# Patient Record
Sex: MALE | Race: WHITE
[De-identification: names, ages, dates, MRNs, and addresses within clinical notes are randomized per-mention and may not be internally consistent; named-entity substitution may affect disease eponyms.]

---

## 2017-03-21 ENCOUNTER — HOSPITAL ENCOUNTER (OUTPATIENT)
Dept: HOSPITAL 68 - STS | Age: 52
End: 2017-03-21
Attending: SURGERY
Payer: COMMERCIAL

## 2017-03-21 VITALS — HEIGHT: 67 IN | BODY MASS INDEX: 30.92 KG/M2 | WEIGHT: 197 LBS

## 2017-03-21 DIAGNOSIS — K40.90: Primary | ICD-10-CM

## 2017-03-21 DIAGNOSIS — I10: ICD-10-CM

## 2017-03-21 PROCEDURE — C9399 UNCLASSIFIED DRUGS OR BIOLOG: HCPCS

## 2017-03-21 PROCEDURE — C1781 MESH (IMPLANTABLE): HCPCS

## 2017-03-23 NOTE — OPERATIVE REPORT
Operative/Inv Procedure Report
Surgery Date: 03/21/17
Name of Procedure:
Laparoscopic preperitoneal mesh repair of left inguino-scrotal hernia
Pre-Operative Diagnosis:
Left inguinal scrotal hernia
Post-Operative Diagnosis:
Same, pantaloon
Estimated Blood Loss: fanta
Surgeon/Assistant:
MELISSA MESSER,GARY ANDINO
Anesthesia: general endotracheal tube
 
Operative/Procedure Note
Note:
Patient was positioned supine on the table.  After successful induction of 
general anesthesia the inguinal and surrounding areas were clipped prepped and 
draped in the usual sterile fashion.  After injection of local anesthetic at the
bottom of the umbilicus off the midline, to the left, a 1 1/2 cm curved incision
was made with a 15 blade, then deepened through Teagan's fascia, sweeping off 
the rectus sheath.  A horizontal 1-1/2 cm incision was made between the fibers, 
elevating these edges with 0 Vicryl stay sutures. Then retracting the muscle 
laterally, clearing off the posterior rectus sheath, this space is developed 
down the midline to the pubis sequentially, with an S retractor, a peanut 
dissector, then the balloon-camera device, inflating it 30-40 pumps while 
watching how it opens up the space, keeping the epigastrics up.  The balloon is 
then replaced with a 10 mm Munoz trocar, inserted, shortened, and secured with 
the stay sutures, gas turned on to 12 not 15 mm. Then two 5 mm trochars are 
inserted in the midline just below the camera, spaced by approximately 3 cm. 
Using mostly blunt dissection with peanuts to define the anatomy, first Ronny's
ligament is swept off medially, checking the medial spaces, direct and femoral. 
There was an obvious direct defect with contents still in it. Then briefly 
skipping over the area of the iliac fat pad, we developed the iliopubic tract 
out laterally to the iliac crest.  Then we returned to the area of the fat pad 
where the hernia sac and the cord structures are adherent, coursing up into an 
attenuated deep ring.  The cord structures form a triangle with the vas 
approaching medially and the main vessels approaching laterally, with the apex 
at the deep ring.  There was some preperitoneal fat up inside in front that was 
dragged down and out, helping identify the distal lip of the hernia sac, which 
is then carefully peeled off the cord structures, especially the vas.  The cord 
is also  from the underlying iliac fat.  Once the hernia sac is 
 from the cord structures down to the base of this "triangle," and 
after reducing the direct hernia, a Parietex sided mesh with the suture, is 
marked and stuffed down the camera trocar, then unfurled in a systematic fashion
, first with the smaller leaflet passing behind the cord structures, until the 
flap covers the epigastrics, covering the deep ring, then the larger leaflet is 
released from the suture, double-covering the smaller one, but also extends 
laterally out to the iliac crest, medially over Ronny's ligament, and 
superiorly towards the camera. There is a third part of the mesh, that covers 
the iliac fat pad like a skirt. The mesh was adjusted back and forth so that the
keyhole is centered around the cord, lays flat and the edges are not curling. A 
trial run of letting the gas escape a little bit to see how the mesh would lay 
as the peritoneum comes back down is done, then when we're satisfied, we let 
rest of the gas escape, pulling out the instruments and trochars. The fascia is 
closed with a figure-of-eight 0 Vicryl suture, tying the stay sutures on top.  
Then we closed the 3 skin incisions with interrupted 4-0 Monocryl, 3 for the 
umbilical, one each for the smaller ones, followed by Mastisol, Steri-Strips and
Band-Aids. Overall estimated blood loss was minimal, lap and sponge counts were 
correct, wound expectancy was clean, IV fluids crystalloid, complications none, 
patient tolerated the procedure well, did not significantly long during 
extubation and was returned to the recovery room in satisfactory condition.

## 2018-08-01 ENCOUNTER — HOSPITAL ENCOUNTER (EMERGENCY)
Dept: HOSPITAL 68 - ERH | Age: 53
End: 2018-08-01
Payer: COMMERCIAL

## 2018-08-01 VITALS — BODY MASS INDEX: 29.82 KG/M2 | HEIGHT: 67 IN | WEIGHT: 190 LBS

## 2018-08-01 VITALS — SYSTOLIC BLOOD PRESSURE: 192 MMHG | DIASTOLIC BLOOD PRESSURE: 110 MMHG

## 2018-08-01 DIAGNOSIS — I10: ICD-10-CM

## 2018-08-01 DIAGNOSIS — S83.92XA: Primary | ICD-10-CM
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
## 2018-08-01 LAB
ABSOLUTE GRANULOCYTE CT: 5.3 /CUMM (ref 1.4–6.5)
BASOPHILS # BLD: 0.1 /CUMM (ref 0–0.2)
BASOPHILS NFR BLD: 0.7 % (ref 0–2)
EOSINOPHIL # BLD: 0.1 /CUMM (ref 0–0.7)
EOSINOPHIL NFR BLD: 1.2 % (ref 0–5)
ERYTHROCYTE [DISTWIDTH] IN BLOOD BY AUTOMATED COUNT: 14.1 % (ref 11.5–14.5)
GRANULOCYTES NFR BLD: 69 % (ref 42.2–75.2)
HCT VFR BLD CALC: 42.5 % (ref 42–52)
LYMPHOCYTES # BLD: 1.6 /CUMM (ref 1.2–3.4)
MCH RBC QN AUTO: 30.9 PG (ref 27–31)
MCHC RBC AUTO-ENTMCNC: 34.5 G/DL (ref 33–37)
MCV RBC AUTO: 89.5 FL (ref 80–94)
MONOCYTES # BLD: 0.6 /CUMM (ref 0.1–0.6)
PLATELET # BLD: 209 /CUMM (ref 130–400)
PMV BLD AUTO: 7.2 FL (ref 7.4–10.4)
RED BLOOD CELL CT: 4.75 /CUMM (ref 4.7–6.1)
WBC # BLD AUTO: 7.7 /CUMM (ref 4.8–10.8)

## 2018-08-01 NOTE — RADIOLOGY REPORT
EXAMINATION:
XR KNEE, LEFT
 
CLINICAL INFORMATION:
Left knee pain.
 
COMPARISON:
None
 
TECHNIQUE:
Four views of the left knee.
 
FINDINGS:
There is no fracture. No dislocation. There is no joint effusion.
 
There is advanced tricompartment degenerative joint disease. There is
bone-on-bone contact between the femur and the tibia at the medial femoral
tibial joint with joint narrowing at the lateral femoral tibial joint. There
is large bone spurs of the femur tibia and patella at all 3 joint space
compartments.
There is no chondrocalcinosis. There are no bone erosions.
 
IMPRESSION:
 
1. No acute abnormality.
2. Advanced tricompartment degenerative joint disease.

## 2018-08-01 NOTE — ED GENERAL ADULT
History of Present Illness
 
General
Chief Complaint: Lower Extremity Injury
Stated Complaint: LFT KNEE INJURY
Source: patient
Exam Limitations: no limitations
 
Vital Signs & Intake/Output
Vital Signs & Intake/Output
 Vital Signs
 
 
Date Time Temp Pulse Resp B/P B/P Pulse O2 O2 Flow FiO2
 
     Mean Ox Delivery Rate 
 
08/01 2100  82 20 192/110  97 Room Air  
 
08/01 2021      97 Room Air  
 
08/01 2019 97.1 88 18 242/140     
 
08/01 1955 97.1 88 18 242/140     
 
08/01 1955 97.1 88 18 242/140     
 
08/01 1834 97.1 88 18 242/140  97   
 
 
 
Allergies
Coded Allergies:
No Known Allergies (03/17/17)
 
Reconcile Medications
Amlodipine Besylate 10 MG TABLET   1 TAB PO QPM HTN
Carvedilol 12.5 MG TABLET   1 TAB PO BID HTN
Lisinopril/Hydrochlorothiazide (Lisinopril-Hctz 20-12.5 MG Tab) 20 MG-12.5 MG 
TABLET   1 TAB PO QAM HTN
Naproxen (Naprosyn) 500 MG TABLET   1 TAB PO BID PRN pain
Potassium Chloride 10 MEQ TABLET.ER   1 TAB PO DAILY hypoK
 
Triage Note:
PT TO TRIAGE WITH COMPLAINTS OF L KNEE/CALF PAIN,
STATES THAT HE HAS A HISTORY OF TORN ACL, AT
[facility] MANUAL /140 PT HAS HTN AND RAN OUT OF
HIS MEDS 1 MONTH AGO.
Triage Nurses Notes Reviewed? yes
Onset: Abrupt
Duration: day(s):
Timing: constant
HPI:
53-year-old male with a history of hypertension presenting with left knee pain 
2-3 days.  Patient reports that he previously had an ACL injury 20-30 years ago 
that required repair, but he never followed up for repair.  States that every 
once in a while he will get acute pain if he twisted the wrong way.  States that
a few days ago he had twisted his knee while walking and has had persistent pain
since then.  Has now developed gradual onset of edema to the lower aspect of the
leg.  Denies numbness or paresthesias.  Patient is also noted to be hypertensive
at triage, states that he ran out of his blood pressure medications and month 
ago and has not yet gotten refills.  Denies headaches, visual changes, chest 
pain, shortness of breath.
(Len ANDINO,Gena)
 
Past History
 
Travel History
Traveled to [place] past 21 day No
 
Medical History
Any Pertinent Medical History? see below for history
Neurological: NONE
EENT: NONE
Cardiovascular: hypertension
Respiratory: NONE
Gastrointestinal: NONE
Hepatic: NONE
Renal: NONE
Musculoskeletal: NONE
Psychiatric: NONE
Endocrine: NONE
Blood Disorders: NONE
Cancer(s): NONE
GYN/Reproductive: NONE
Tetanus Vaccine: 05/13/18
 
Surgical History
Surgical History: non-contributory
 
Psychosocial History
Who do you live with Family
Services at Home None
What is your primary language English
Tobacco Use: Never used
ETOH Use: denies use
Illicit Drug Use: denies illicit drug use
 
Family History
Hx Contributory? No
(Gena Otero)
 
Review of Systems
 
Review of Systems
Constitutional:
Reports: no symptoms. 
EENTM:
Reports: no symptoms. 
Respiratory:
Reports: no symptoms. 
Cardiovascular:
Reports: no symptoms. 
GI:
Reports: no symptoms. 
Genitourinary:
Reports: no symptoms. 
Musculoskeletal:
Reports: see HPI. 
Skin:
Reports: no symptoms. 
Neurological/Psychological:
Reports: no symptoms. 
Hematologic/Endocrine:
Reports: no symptoms. 
Immunologic/Allergic:
Reports: no symptoms. 
All Other Systems: Reviewed and Negative
(Gena Otero)
 
Physical Exam
 
Physical Exam
General Appearance: well developed/nourished, no apparent distress, alert, awake
, comfortable
Head: atraumatic, normal appearance
Eyes:
Bilateral: normal appearance. 
Neck: normal inspection
Respiratory: normal breath sounds, lungs clear
Cardiovascular: regular rate/rhythm
Gastrointestinal: soft, non-tender
Back: normal inspection
Extremities: on exam of the left lower extremity there is edema, increased 
warmth, and trace erythema to the lower leg with diffuse tenderness to palpation
around the lower leg and lateral knee.  No knee instability.  Unrestricted range
of motion with knee flexion and extension.  Sensation intact.  Motor strength 5 
out of 5.  Patient is able to bear weight and ambulate, but ambulance with a 
limp favoring the right side.  Distal pulses 2+.
Neurologic/Psych: awake, alert, oriented x 3, normal mood/affect
Skin: intact, warm/dry
 
Core Measures
ACS in differential dx? No
CVA/TIA Diagnosis: No
Sepsis Present: No
Sepsis Focused Exam Completed? No
(Gena Otero)
 
Progress
Differential Diagnoses
I considered the following diagnoses in my evaluation of the patient: [Ligament 
sprain versus MSK strain versus DVT, low concern for cellulitis.  Elevated BP, 
low concern for hypertensive urgency/emergency.]
 
Plan of Care:
 Orders
 
 
Procedure Date/time Status
 
Durable Medical Equipment 08/01 2121 Active
 
TROPONIN LEVEL 08/01 1842 Complete
 
COMPREHENSIVE METABOLIC PANEL 08/01 1842 Complete
 
CBC WITHOUT DIFFERENTIAL 08/01 1842 Complete
 
EKG 08/01 1842 Active
 
 
 Laboratory Tests
 
 
 
08/01/18 1945:
Anion Gap 12, Estimated GFR > 60, BUN/Creatinine Ratio 22.0, Glucose 81, Calcium
9.7, Total Bilirubin 1.0, AST 51, ALT 51, Alkaline Phosphatase 54, Troponin I 
0.03, Total Protein 7.5, Albumin 4.7, Globulin 2.8, Albumin/Globulin Ratio 1.7, 
CBC w Diff NO MAN DIFF REQ, RBC 4.75, MCV 89.5, MCH 30.9, MCHC 34.5, RDW 14.1, 
MPV 7.2  L, Gran % 69.0, Lymphocytes % 21.0, Monocytes % 8.1, Eosinophils % 1.2,
Basophils % 0.7, Absolute Granulocytes 5.3, Absolute Lymphocytes 1.6, Absolute 
Monocytes 0.6, Absolute Eosinophils 0.1, Absolute Basophils 0.1
 
Labs notable for hypokalemia at 2.9, given oral repletion in the emergency 
department.  Likely from patient's previous hydrochlorothiazide use.  Will send 
home with a prescription for potassium supplement.
Patient was given doses of his last prescribed blood pressure medications with 
some improvement in his blood pressure reading.  He has remained asymptomatic, 
and has a nonischemic EKG.  Therefore he can be discharged home with 
prescriptions for his blood pressure medications.  He was counseled on the 
importance of resuming these medications.
Ultrasound was negative for DVT and x-ray unremarkable.  Patient likely has 
ligament injury and was placed in a knee immobilizer and given crutches for 
ambulation.  He will follow up with orthopedics for reevaluation.  Given Rx 
naproxen when necessary pain.
Counseled on supportive care and strict return precautions.
Initial ED EKG: NSR, LVH, no ST T wave changes
(Gena Otero)
 
Departure
 
Departure
Disposition: HOME OR SELF CARE
Condition: Stable
Clinical Impression
Primary Impression: Left knee sprain
Secondary Impressions: Hypertension
Referrals:
Talita Lee MD
 
Patient Has No Primary Care Dr (PCP/Family)
 
Additional Instructions:
Resumed taking your blood pressure medications as prescribed.  Use naproxen as 
needed for pain.  Follow-up with orthopedics for reevaluation of urinary.  
Return to the emergency department for any new or worsening symptoms.
Departure Forms:
Customer Survey
General Discharge Information
Prescriptions:
Current Visit Scripts
Amlodipine Besylate 1 TAB PO QPM  
     #30 TAB 
 
Carvedilol 1 TAB PO BID  
     #60 TAB 
 
Lisinopril/Hydrochlorothiazide (Lisinopril-Hctz 20-12.5 MG Tab) 1 TAB PO QAM  
     #30 TAB 
 
Naproxen (Naprosyn) 1 TAB PO BID PRN pain 
     #60 TAB 
 
Potassium Chloride 1 TAB PO DAILY  
     #30 TAB 
 
 
(Gena Otero)
 
PA/NP Co-Sign Statement
Statement:
ED Attending supervision documentation-
 
 I saw and evaluated the patient. I have also reviewed all the pertinent lab 
results and diagnostic results. I agree with the findings and the plan of care 
as documented in the PA's/NP's documentation. 
 
x I have reviewed the ED Record and agree with the PA's/NP's documentation.
 
[] Additions or exceptions (if any) to the PAs/NP's note and plan are 
summarized below:
[]
 
(Tramaine MESSER,[place]on)
 
Critical Care Note
 
Critical Care Note
Critical Care Time: non-applicable
(Gena Otero)

## 2018-08-01 NOTE — ULTRASOUND REPORT
EXAMINATION:
US TRIPLEX LOWER EXTREMITY, LEFT
 
CLINICAL INFORMATION:
Left leg swelling and pain
 
COMPARISON:
None
 
TECHNIQUE:
Color-flow triplex imaging with spectral analysis and compression Doppler
were performed on the lower extremity.
 
FINDINGS:
Respiratory variation, normal compression and augmented flow are noted
throughout the lower extremity. The visualized common femoral vein,
superficial femoral vein, profunda femoral vein, popliteal vein and midcalf
peroneal and posterior tibial venous segments show no evidence of deep venous
thrombosis.
 
There is no Baker's cyst.
 
IMPRESSION:
No evidence of deep venous thrombosis involving the lower extremity.